# Patient Record
Sex: FEMALE | Race: WHITE | NOT HISPANIC OR LATINO | Employment: FULL TIME | URBAN - METROPOLITAN AREA
[De-identification: names, ages, dates, MRNs, and addresses within clinical notes are randomized per-mention and may not be internally consistent; named-entity substitution may affect disease eponyms.]

---

## 2024-04-28 ENCOUNTER — APPOINTMENT (OUTPATIENT)
Dept: RADIOLOGY | Facility: HOSPITAL | Age: 19
End: 2024-04-28
Payer: MEDICAID

## 2024-04-28 ENCOUNTER — HOSPITAL ENCOUNTER (EMERGENCY)
Facility: HOSPITAL | Age: 19
Discharge: HOME | End: 2024-04-28
Payer: MEDICAID

## 2024-04-28 VITALS
HEART RATE: 99 BPM | DIASTOLIC BLOOD PRESSURE: 84 MMHG | TEMPERATURE: 99.7 F | BODY MASS INDEX: 28.12 KG/M2 | WEIGHT: 175 LBS | OXYGEN SATURATION: 100 % | HEIGHT: 66 IN | SYSTOLIC BLOOD PRESSURE: 144 MMHG | RESPIRATION RATE: 18 BRPM
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
## 2024-04-28 PROCEDURE — 2500000001 HC RX 250 WO HCPCS SELF ADMINISTERED DRUGS (ALT 637 FOR MEDICARE OP): Performed by: PHYSICIAN ASSISTANT

## 2024-04-28 PROCEDURE — 99283 EMERGENCY DEPT VISIT LOW MDM: CPT

## 2024-04-28 PROCEDURE — 73130 X-RAY EXAM OF HAND: CPT | Mod: RIGHT SIDE | Performed by: RADIOLOGY

## 2024-04-28 PROCEDURE — 73130 X-RAY EXAM OF HAND: CPT | Mod: RT

## 2024-04-28 RX ORDER — AMOXICILLIN AND CLAVULANATE POTASSIUM 875; 125 MG/1; MG/1
1 TABLET, FILM COATED ORAL EVERY 12 HOURS
Qty: 14 TABLET | Refills: 0 | Status: SHIPPED | OUTPATIENT
Start: 2024-04-28 | End: 2024-05-05

## 2024-04-28 RX ORDER — IBUPROFEN 400 MG/1
400 TABLET ORAL ONCE
Status: COMPLETED | OUTPATIENT
Start: 2024-04-28 | End: 2024-04-28

## 2024-04-28 RX ORDER — AMOXICILLIN AND CLAVULANATE POTASSIUM 875; 125 MG/1; MG/1
1 TABLET, FILM COATED ORAL ONCE
Status: COMPLETED | OUTPATIENT
Start: 2024-04-28 | End: 2024-04-28

## 2024-04-28 RX ADMIN — AMOXICILLIN AND CLAVULANATE POTASSIUM 1 TABLET: 875; 125 TABLET, FILM COATED ORAL at 22:00

## 2024-04-28 RX ADMIN — IBUPROFEN 400 MG: 400 TABLET ORAL at 22:00

## 2024-04-28 ASSESSMENT — COLUMBIA-SUICIDE SEVERITY RATING SCALE - C-SSRS
1. IN THE PAST MONTH, HAVE YOU WISHED YOU WERE DEAD OR WISHED YOU COULD GO TO SLEEP AND NOT WAKE UP?: NO
2. HAVE YOU ACTUALLY HAD ANY THOUGHTS OF KILLING YOURSELF?: NO
6. HAVE YOU EVER DONE ANYTHING, STARTED TO DO ANYTHING, OR PREPARED TO DO ANYTHING TO END YOUR LIFE?: NO

## 2024-04-28 ASSESSMENT — PAIN DESCRIPTION - ORIENTATION: ORIENTATION: RIGHT

## 2024-04-28 ASSESSMENT — PAIN DESCRIPTION - PAIN TYPE: TYPE: ACUTE PAIN

## 2024-04-28 ASSESSMENT — PAIN DESCRIPTION - FREQUENCY: FREQUENCY: CONSTANT/CONTINUOUS

## 2024-04-28 ASSESSMENT — PAIN DESCRIPTION - LOCATION: LOCATION: HAND

## 2024-04-28 ASSESSMENT — PAIN - FUNCTIONAL ASSESSMENT: PAIN_FUNCTIONAL_ASSESSMENT: 0-10

## 2024-04-28 ASSESSMENT — PAIN SCALES - GENERAL: PAINLEVEL_OUTOF10: 9

## 2024-04-28 ASSESSMENT — PAIN DESCRIPTION - DESCRIPTORS: DESCRIPTORS: SHARP

## 2024-04-29 NOTE — ED PROVIDER NOTES
"Limitations to History: None  External Records Reviewed  Independent Historians: None  Social determinants affecting care: None    HPI  Oumou Man is a 18 y.o. female who presents emergency department for assessment of a dog bite to the right hand just prior to the ER visit.  She reports that her dog is food aggressive.  She reports that her dog got into something she did not want him to get into and she tried to remove this from him and he bit her right hand.  She is reporting a lot of pain to the dorsum of the right hand.  She reports that her dog is up-to-date on vaccines.  She reports that she is up-to-date on her vaccines.  She denies sustaining any other injuries.  She denies any loss of function of the hand or digits of the right hand.  She denies any numbness or tingling.  She denies any chance of pregnancy.  She has no further complaints.    PMH  No past medical history on file. reviewed by myself.    Meds  Current Outpatient Medications   Medication Instructions    amoxicillin-pot clavulanate (Augmentin) 875-125 mg tablet 1 tablet, oral, Every 12 hours       Allergies  No Known Allergies reviewed by myself.    SHx    reviewed by myself.      ------------------------------------------------------------------------------------------------------------------------------------------    /84 (BP Location: Left arm, Patient Position: Sitting)   Pulse 99   Temp 37.6 °C (99.7 °F) (Temporal)   Resp 18   Ht 1.676 m (5' 6\")   Wt 79.4 kg (175 lb)   LMP 04/15/2024 (Approximate)   SpO2 100%   BMI 28.25 kg/m²     Physical Exam  Vitals and nursing note reviewed.   Constitutional:       General: She is not in acute distress.     Appearance: Normal appearance. She is normal weight. She is not ill-appearing or toxic-appearing.   HENT:      Head: Normocephalic.      Nose: Nose normal.      Mouth/Throat:      Mouth: Mucous membranes are moist.   Eyes:      Extraocular Movements: Extraocular movements intact.     "  Conjunctiva/sclera: Conjunctivae normal.   Cardiovascular:      Rate and Rhythm: Normal rate and regular rhythm.   Pulmonary:      Effort: Pulmonary effort is normal.      Breath sounds: Normal breath sounds.   Musculoskeletal:      Right wrist: No swelling, tenderness or bony tenderness. Normal range of motion. Normal pulse.      Right hand: Tenderness present. No swelling, deformity or bony tenderness. Normal range of motion. Normal strength. Normal sensation. Normal capillary refill. Normal pulse.      Cervical back: Neck supple.   Skin:     General: Skin is warm and dry.      Comments: 2 cm linear laceration to the dorsum of the right hand without active bleeding.  Puncture wound to the palmar surface of the right hand at the second distal metacarpal   Neurological:      Mental Status: She is alert and oriented to person, place, and time.   Psychiatric:         Attention and Perception: Attention normal.         Mood and Affect: Mood normal.          ------------------------------------------------------------------------------------------------------------------------------------------  Labs  Labs Reviewed - No data to display     Imaging  XR hand right 3+ views   Final Result   No acute fracture             MACRO:   None        Signed by: Vivek Pacheco 4/28/2024 9:46 PM   Dictation workstation:   [id number]           ED Course  Diagnoses as of 04/28/24 2222   Dog bite, initial encounter        Medical Decision Making: She did not appear ill or toxic.  Vital signs reviewed and stable.  X-ray obtained.  Wounds were cleaned with normal saline and Hibiclens been answering staff.  Steri-Strips were placed to better approximate the wounds.  I do not want to suture due to the increased risk of infection from dog bite.    Differential diagnoses considered: Fracture, contusion, sprain, strain, others    Medications given: Oral ibuprofen    X-ray of the hand showed no acute fracture.  I discussed with the patient  that we will place her on Augmentin 875 mg twice daily for the next 7 days.  She was given her first dose in the emergency department.  She was referred to the wound care clinic for follow-up appointment.  I discussed with her about signs and symptoms of infection.  She is to return to the ER immediately if her symptoms change or worsen.  She verbalized understanding and agreed to plan of care.  She was discharged home in stable condition.    Diagnosis: Dog bite  Plan: Discharge           Genaro Day PA-C  04/28/24 6729

## 2024-04-29 NOTE — DISCHARGE INSTRUCTIONS
Keep your wound clean and dry.  Look for signs of infection such as redness, streaking, drainage, fevers.  Antibiotics were sent to your pharmacy and I recommend you start these tomorrow morning to help prevent infection.  Tylenol/ibuprofen for pain control.  Use ice to help with swelling and pain.  Return to ER if your symptoms change or worsen.